# Patient Record
Sex: FEMALE | Race: WHITE | Employment: UNEMPLOYED | URBAN - METROPOLITAN AREA
[De-identification: names, ages, dates, MRNs, and addresses within clinical notes are randomized per-mention and may not be internally consistent; named-entity substitution may affect disease eponyms.]

---

## 2017-01-27 ENCOUNTER — HOSPITAL ENCOUNTER (OUTPATIENT)
Dept: MAMMOGRAPHY | Age: 52
Discharge: HOME | End: 2017-01-27
Attending: INTERNAL MEDICINE
Payer: COMMERCIAL

## 2017-01-27 DIAGNOSIS — Z12.31 VISIT FOR SCREENING MAMMOGRAM: ICD-10-CM

## 2017-01-27 PROCEDURE — 77067 SCR MAMMO BI INCL CAD: CPT

## 2018-02-13 ENCOUNTER — HOSPITAL ENCOUNTER (OUTPATIENT)
Dept: MAMMOGRAPHY | Age: 53
Discharge: HOME | End: 2018-02-13
Attending: INTERNAL MEDICINE
Payer: COMMERCIAL

## 2018-02-13 DIAGNOSIS — Z12.31 ENCOUNTER FOR SCREENING MAMMOGRAM FOR MALIGNANT NEOPLASM OF BREAST: ICD-10-CM

## 2018-02-13 PROCEDURE — 77067 SCR MAMMO BI INCL CAD: CPT

## 2019-02-13 ENCOUNTER — HOSPITAL ENCOUNTER (EMERGENCY)
Age: 54
Discharge: HOME | End: 2019-02-13
Attending: EMERGENCY MEDICINE
Payer: COMMERCIAL

## 2019-02-13 VITALS
BODY MASS INDEX: 26.81 KG/M2 | HEIGHT: 65 IN | DIASTOLIC BLOOD PRESSURE: 64 MMHG | OXYGEN SATURATION: 100 % | WEIGHT: 160.94 LBS | SYSTOLIC BLOOD PRESSURE: 122 MMHG | TEMPERATURE: 97.3 F | RESPIRATION RATE: 16 BRPM | HEART RATE: 56 BPM

## 2019-02-13 DIAGNOSIS — T15.92XA FOREIGN BODY OF LEFT EYE, INITIAL ENCOUNTER: Primary | ICD-10-CM

## 2019-02-13 PROCEDURE — 74011000250 HC RX REV CODE- 250: Performed by: EMERGENCY MEDICINE

## 2019-02-13 PROCEDURE — 74011250637 HC RX REV CODE- 250/637: Performed by: EMERGENCY MEDICINE

## 2019-02-13 PROCEDURE — 75810000145 HC RMVL FB EAR W/O GEN ANES

## 2019-02-13 PROCEDURE — 99283 EMERGENCY DEPT VISIT LOW MDM: CPT

## 2019-02-13 PROCEDURE — 77030018836 HC SOL IRR NACL ICUM -A

## 2019-02-13 RX ORDER — ESCITALOPRAM OXALATE 10 MG/1
10 TABLET ORAL DAILY
COMMUNITY

## 2019-02-13 RX ORDER — TETRACAINE HYDROCHLORIDE 5 MG/ML
1 SOLUTION OPHTHALMIC
Status: COMPLETED | OUTPATIENT
Start: 2019-02-13 | End: 2019-02-13

## 2019-02-13 RX ORDER — QUETIAPINE FUMARATE 50 MG/1
50 TABLET, FILM COATED ORAL 2 TIMES DAILY
COMMUNITY

## 2019-02-13 RX ORDER — ERYTHROMYCIN 5 MG/G
OINTMENT OPHTHALMIC
Qty: 3.5 G | Refills: 0 | Status: SHIPPED | OUTPATIENT
Start: 2019-02-13

## 2019-02-13 RX ORDER — ERYTHROMYCIN 5 MG/G
OINTMENT OPHTHALMIC
Status: COMPLETED | OUTPATIENT
Start: 2019-02-13 | End: 2019-02-13

## 2019-02-13 RX ORDER — AMOXICILLIN 500 MG/1
500 CAPSULE ORAL 3 TIMES DAILY
COMMUNITY

## 2019-02-13 RX ADMIN — ERYTHROMYCIN: 5 OINTMENT OPHTHALMIC at 22:05

## 2019-02-13 RX ADMIN — FLUORESCEIN SODIUM 1 STRIP: 1 STRIP OPHTHALMIC at 21:01

## 2019-02-13 RX ADMIN — TETRACAINE HYDROCHLORIDE 1 DROP: 5 SOLUTION OPHTHALMIC at 21:01

## 2019-02-14 NOTE — ED TRIAGE NOTES
Felt like she had something in her L eye this morning, continued throughout day, tried to rinse out and became more irritated/painful. Has been tearing, no d/c.

## 2019-02-14 NOTE — DISCHARGE INSTRUCTIONS
Patient Education   - Erythromycin ointment as prescribed. - Ibuprofen and/or Acetaminophen as needed for pain. - Return to ED for increased pain, change in vision, any other concerns. Object in the Eye: Care Instructions  Your Care Instructions  It is common for a speck of dirt or a small object, such as an eyelash or an insect, to get in the eye. Usually your tears wash the object out. But the speck can scratch the surface of the eye (cornea). If the eye surface is scratched, it can feel as if something is still in the eye. Most surface scratches are minor and heal on their own in a day or two. If the object was still in your eye, your doctor probably removed it during your exam. Sometimes these objects become stuck deep in the eye and require more treatment. For instance, a metal object may leave a rust ring. Follow-up care is a key part of your treatment and safety. Be sure to make and go to all appointments, and call your doctor if you are having problems. It's also a good idea to know your test results and keep a list of the medicines you take. How can you care for yourself at home? · The doctor probably used medicine to numb your eye. When it wears off in 30 to 60 minutes, your eye pain may come back. Take over-the-counter pain medicine, such as acetaminophen (Tylenol), ibuprofen (Advil, Motrin), or naproxen (Aleve), as needed. Read and follow all instructions on the label. · Do not take two or more pain medicines at the same time unless the doctor told you to. Many pain medicines have acetaminophen, which is Tylenol. Too much acetaminophen (Tylenol) can be harmful. · If your doctor prescribed antibiotics, take them as directed. Do not stop taking them just because you feel better. You need to take the full course of antibiotics. · The doctor may have put a patch over your injured eye. If so, keep your eye closed under the patch. This will make your eye feel better.  Do not remove the patch until your doctor has told you to. · If you do not have a patch, keep your hurt eye closed to reduce pain. · Wash your hands before touching your eye. · Do not rub your injured eye. Rubbing can make it worse. · Use the prescribed eyedrops or ointment as directed. Be sure the dropper or bottle tip is clean. · To put in eyedrops or ointment:  ? Tilt your head back, and pull your lower eyelid down with one finger. ? Drop or squirt the medicine inside the lower lid. ? Close your eye for 30 to 60 seconds to let the drops or ointment move around. ? Do not touch the ointment or dropper tip to your eyelashes or any other surface. · Do not use a contact lens in your hurt eye until your doctor says you can. Also, do not wear eye makeup until your eye heals. · Do not drive if you are wearing an eye patch. You cannot  distances well. · For the first 24 to 48 hours, limit reading and other tasks that require a lot of eye movement. · Bright light may hurt. It may help to wear dark glasses. · To prevent eye injuries in the future, wear safety glasses or goggles when you work with machines or tools, mow the lawn, or ride a bike or motorcycle. When should you call for help? Call 911 anytime you think you may need emergency care. For example, call if:    · You suddenly cannot see or can barely see.    Call your doctor now or seek immediate medical care if:    · You have signs of infection in the eye, such as:  ? Pus or thick discharge coming from the eye.  ? Redness or swelling around the eye.  ? A fever.     · You have new or increasing eye pain.     · It feels like sand is in your eye when you blink.    Watch closely for changes in your health, and be sure to contact your doctor if:    · You are not getting better after 1 day (24 hours). Where can you learn more? Go to http://marv.info/. Enter V224 in the search box to learn more about \"Object in the Eye: Care Instructions. \"  Current as of: September 23, 2018  Content Version: 11.9  © 8687-7667 "[employer]. Care instructions adapted under license by [employer] (which disclaims liability or warranty for this information). If you have questions about a medical condition or this instruction, always ask your healthcare professional. [address] any warranty or liability for your use of this information.

## 2019-02-14 NOTE — ED NOTES
Pt states she is feeling better overall, eye irritation has improved. MD provided with d/c instructions. Pt ambulated out with steady gait.

## 2019-02-14 NOTE — ED PROVIDER NOTES
The history is provided by the patient and the spouse. Eye Pain This is a new problem. The current episode started 6 to 12 hours ago. The problem occurs constantly. The problem has been gradually worsening. The left eye is affected. The injury mechanism is unknown (The patient felt that something got in her eye this AM. Of note, she also recently started using a new eye cream. ). The pain is at a severity of 4/10. The pain is moderate. There is no history of trauma to the eye. There is no known exposure to pink eye. She does not wear contacts. Associated symptoms include foreign body sensation, eye redness and pain. Pertinent negatives include no numbness, no blurred vision, no decreased vision, no discharge, no double vision, no photophobia, no nausea, no vomiting, no tingling, no weakness, no itching, no fever, no blindness, no head injury and no dizziness. She has tried commercial eye wash (She feels the eye is more inflammed after irrigation. ) for the symptoms. The treatment provided no relief. No past medical history on file. No past surgical history on file. Family History:  
Problem Relation Age of Onset  Breast Cancer Mother 70  
     70 and 79 Social History Socioeconomic History  Marital status:  Spouse name: Not on file  Number of children: Not on file  Years of education: Not on file  Highest education level: Not on file Social Needs  Financial resource strain: Not on file  Food insecurity - worry: Not on file  Food insecurity - inability: Not on file  Transportation needs - medical: Not on file  Transportation needs - non-medical: Not on file Occupational History  Not on file Tobacco Use  Smoking status: Never Smoker  Smokeless tobacco: Never Used Substance and Sexual Activity  Alcohol use: No  
  Frequency: Never  Drug use: No  
 Sexual activity: Not on file Other Topics Concern  Not on file Social History Narrative  Not on file ALLERGIES: Patient has no known allergies. Review of Systems Constitutional: Negative for fever. HENT: Negative for congestion, nosebleeds and sore throat. Eyes: Positive for pain, redness and itching. Negative for blindness, blurred vision, double vision, photophobia, discharge and visual disturbance. Respiratory: Negative for cough and shortness of breath. Cardiovascular: Negative for chest pain. Gastrointestinal: Negative for abdominal pain, nausea and vomiting. Skin: Negative for itching, rash and wound. Neurological: Negative for dizziness, tingling, weakness, numbness and headaches. Hematological: Negative for adenopathy. Psychiatric/Behavioral: Negative. All other systems reviewed and are negative. Vitals:  
 02/13/19 2041 02/13/19 2210 BP: 138/77 122/64 Pulse: 74 (!) 56 Resp: 20 16 Temp: 97.9 °F (36.6 °C) 97.3 °F (36.3 °C) SpO2: 94% 100% Weight: 73 kg (160 lb 15 oz) Height: 5' 5.35\" (1.66 m) Physical Exam  
Constitutional: She appears well-developed and well-nourished. HENT:  
Head: Normocephalic and atraumatic. Eyes: EOM are normal. Pupils are equal, round, and reactive to light. Right eye exhibits no discharge. Left eye exhibits no discharge. Left conjunctiva is injected. No scleral icterus. Neck: Normal range of motion. Neck supple. Cardiovascular: Normal rate, regular rhythm and normal heart sounds. Pulmonary/Chest: Effort normal and breath sounds normal.  
Abdominal: Soft. Bowel sounds are normal.  
Neurological: She is alert. Skin: Skin is warm and dry. Psychiatric: She has a normal mood and affect. Nursing note and vitals reviewed. MDM Procedures A/P: 
1. Foreign body L eye - removed. Eye irrigated with NS in ED. No definite abrasion seen with Wood's lamp or Slit lamp. Plan erythromycin and close ophtho follow-up. Patient's results have been reviewed with them. Patient and/or family have verbally conveyed their understanding and agreement of the patient's signs, symptoms, diagnosis, treatment and prognosis and additionally agree to follow up as recommended or return to the Emergency Room should their condition change prior to follow-up. Discharge instructions have also been provided to the patient with some educational information regarding their diagnosis as well a list of reasons why they would want to return to the ER prior to their follow-up appointment should their condition change.

## 2019-02-21 ENCOUNTER — HOSPITAL ENCOUNTER (OUTPATIENT)
Dept: MAMMOGRAPHY | Age: 54
Discharge: HOME | End: 2019-02-21
Attending: OBSTETRICS & GYNECOLOGY
Payer: COMMERCIAL

## 2019-02-21 DIAGNOSIS — Z12.39 BREAST SCREENING: ICD-10-CM

## 2019-02-21 PROCEDURE — 77063 BREAST TOMOSYNTHESIS BI: CPT

## 2020-03-04 ENCOUNTER — HOSPITAL ENCOUNTER (OUTPATIENT)
Dept: MAMMOGRAPHY | Age: 55
Discharge: HOME | End: 2020-03-04
Attending: INTERNAL MEDICINE
Payer: COMMERCIAL

## 2020-03-04 DIAGNOSIS — Z12.31 VISIT FOR SCREENING MAMMOGRAM: ICD-10-CM

## 2020-03-04 PROCEDURE — 77063 BREAST TOMOSYNTHESIS BI: CPT

## 2021-02-25 ENCOUNTER — TRANSCRIBE ORDER (OUTPATIENT)
Dept: SCHEDULING | Age: 56
End: 2021-02-25

## 2021-02-25 DIAGNOSIS — Z12.31 VISIT FOR SCREENING MAMMOGRAM: Primary | ICD-10-CM

## 2021-03-23 ENCOUNTER — HOSPITAL ENCOUNTER (OUTPATIENT)
Dept: MAMMOGRAPHY | Age: 56
Discharge: HOME | End: 2021-03-23
Attending: OBSTETRICS & GYNECOLOGY
Payer: COMMERCIAL

## 2021-03-23 DIAGNOSIS — Z12.31 VISIT FOR SCREENING MAMMOGRAM: ICD-10-CM

## 2021-03-23 PROCEDURE — 77063 BREAST TOMOSYNTHESIS BI: CPT

## 2022-03-01 ENCOUNTER — TRANSCRIBE ORDER (OUTPATIENT)
Dept: SCHEDULING | Age: 57
End: 2022-03-01

## 2022-03-01 DIAGNOSIS — Z12.31 SCREENING MAMMOGRAM FOR HIGH-RISK PATIENT: Primary | ICD-10-CM

## 2022-04-20 ENCOUNTER — HOSPITAL ENCOUNTER (OUTPATIENT)
Dept: MAMMOGRAPHY | Age: 57
Discharge: HOME | End: 2022-04-20
Attending: OBSTETRICS & GYNECOLOGY
Payer: COMMERCIAL

## 2022-04-20 DIAGNOSIS — Z12.31 SCREENING MAMMOGRAM FOR HIGH-RISK PATIENT: ICD-10-CM

## 2022-04-20 PROCEDURE — 77063 BREAST TOMOSYNTHESIS BI: CPT

## 2023-03-30 ENCOUNTER — TRANSCRIBE ORDER (OUTPATIENT)
Dept: SCHEDULING | Age: 58
End: 2023-03-30

## 2023-03-30 DIAGNOSIS — Z12.31 SCREENING MAMMOGRAM FOR HIGH-RISK PATIENT: Primary | ICD-10-CM

## 2023-04-23 DIAGNOSIS — Z12.31 SCREENING MAMMOGRAM FOR HIGH-RISK PATIENT: Primary | ICD-10-CM

## 2023-07-12 ENCOUNTER — HOSPITAL ENCOUNTER (OUTPATIENT)
Facility: HOSPITAL | Age: 58
Discharge: HOME | End: 2023-07-15
Payer: COMMERCIAL

## 2023-07-12 DIAGNOSIS — Z12.31 SCREENING MAMMOGRAM FOR HIGH-RISK PATIENT: ICD-10-CM

## 2023-07-12 PROCEDURE — 77063 BREAST TOMOSYNTHESIS BI: CPT

## 2024-07-01 ENCOUNTER — TRANSCRIBE ORDERS (OUTPATIENT)
Facility: HOSPITAL | Age: 59
End: 2024-07-01

## 2024-07-01 DIAGNOSIS — Z12.31 SCREENING MAMMOGRAM FOR HIGH-RISK PATIENT: Primary | ICD-10-CM

## 2024-08-05 ENCOUNTER — HOSPITAL ENCOUNTER (OUTPATIENT)
Facility: HOSPITAL | Age: 59
Discharge: HOME | End: 2024-08-08
Payer: COMMERCIAL

## 2024-08-05 VITALS — HEIGHT: 64 IN | WEIGHT: 163 LBS | BODY MASS INDEX: 27.83 KG/M2

## 2024-08-05 DIAGNOSIS — Z12.31 SCREENING MAMMOGRAM FOR HIGH-RISK PATIENT: ICD-10-CM

## 2024-08-05 PROCEDURE — 77063 BREAST TOMOSYNTHESIS BI: CPT

## 2025-07-02 ENCOUNTER — TRANSCRIBE ORDERS (OUTPATIENT)
Facility: HOSPITAL | Age: 60
End: 2025-07-02

## 2025-07-02 DIAGNOSIS — Z12.31 VISIT FOR SCREENING MAMMOGRAM: Primary | ICD-10-CM

## 2025-08-26 ENCOUNTER — HOSPITAL ENCOUNTER (OUTPATIENT)
Facility: HOSPITAL | Age: 60
Discharge: HOME | End: 2025-08-29
Payer: COMMERCIAL

## 2025-08-26 VITALS — HEIGHT: 64 IN | WEIGHT: 159 LBS | BODY MASS INDEX: 27.14 KG/M2

## 2025-08-26 DIAGNOSIS — Z12.31 VISIT FOR SCREENING MAMMOGRAM: ICD-10-CM

## 2025-08-26 PROCEDURE — 77063 BREAST TOMOSYNTHESIS BI: CPT
